# Patient Record
Sex: MALE | Race: WHITE | URBAN - METROPOLITAN AREA
[De-identification: names, ages, dates, MRNs, and addresses within clinical notes are randomized per-mention and may not be internally consistent; named-entity substitution may affect disease eponyms.]

---

## 2024-01-02 ENCOUNTER — APPOINTMENT (OUTPATIENT)
Dept: CT IMAGING | Age: 54
DRG: 254 | End: 2024-01-02
Payer: COMMERCIAL

## 2024-01-02 ENCOUNTER — HOSPITAL ENCOUNTER (INPATIENT)
Age: 54
LOS: 1 days | Discharge: ANOTHER HOSPITAL | DRG: 254 | End: 2024-01-04
Attending: STUDENT IN AN ORGANIZED HEALTH CARE EDUCATION/TRAINING PROGRAM | Admitting: INTERNAL MEDICINE
Payer: COMMERCIAL

## 2024-01-02 ENCOUNTER — APPOINTMENT (OUTPATIENT)
Dept: GENERAL RADIOLOGY | Age: 54
DRG: 254 | End: 2024-01-02
Payer: COMMERCIAL

## 2024-01-02 DIAGNOSIS — I95.9 HYPOTENSION, UNSPECIFIED HYPOTENSION TYPE: ICD-10-CM

## 2024-01-02 DIAGNOSIS — S30.1XXA ABDOMINAL WALL HEMATOMA, INITIAL ENCOUNTER: Primary | ICD-10-CM

## 2024-01-02 LAB
ABO + RH BLD: NORMAL
ALBUMIN SERPL-MCNC: 4 G/DL (ref 3.4–5)
ALBUMIN/GLOB SERPL: 2.2 {RATIO} (ref 1.1–2.2)
ALP SERPL-CCNC: 48 U/L (ref 40–129)
ALT SERPL-CCNC: 15 U/L (ref 10–40)
ANION GAP SERPL CALCULATED.3IONS-SCNC: 8 MMOL/L (ref 3–16)
AST SERPL-CCNC: 14 U/L (ref 15–37)
BASE EXCESS BLDV CALC-SCNC: 1 MMOL/L (ref -3–3)
BASOPHILS # BLD: 0.1 K/UL (ref 0–0.2)
BASOPHILS NFR BLD: 0.7 %
BILIRUB SERPL-MCNC: <0.2 MG/DL (ref 0–1)
BILIRUB UR QL STRIP.AUTO: NEGATIVE
BLD GP AB SCN SERPL QL: NORMAL
BUN SERPL-MCNC: 30 MG/DL (ref 7–20)
CALCIUM SERPL-MCNC: 8.6 MG/DL (ref 8.3–10.6)
CHLORIDE SERPL-SCNC: 95 MMOL/L (ref 99–110)
CLARITY UR: CLEAR
CO2 BLDV-SCNC: 25 MMOL/L
CO2 SERPL-SCNC: 29 MMOL/L (ref 21–32)
COHGB MFR BLDV: 5.7 % (ref 0–1.5)
COLOR UR: YELLOW
CREAT SERPL-MCNC: 1.1 MG/DL (ref 0.9–1.3)
DEPRECATED RDW RBC AUTO: 13 % (ref 12.4–15.4)
EOSINOPHIL # BLD: 0.4 K/UL (ref 0–0.6)
EOSINOPHIL NFR BLD: 4.8 %
FLUAV RNA RESP QL NAA+PROBE: NOT DETECTED
FLUBV RNA RESP QL NAA+PROBE: NOT DETECTED
GFR SERPLBLD CREATININE-BSD FMLA CKD-EPI: >60 ML/MIN/{1.73_M2}
GLUCOSE SERPL-MCNC: 114 MG/DL (ref 70–99)
GLUCOSE UR STRIP.AUTO-MCNC: NEGATIVE MG/DL
HCO3 BLDV-SCNC: 23.6 MMOL/L (ref 23–29)
HCT VFR BLD AUTO: 38.5 % (ref 40.5–52.5)
HGB BLD-MCNC: 13.1 G/DL (ref 13.5–17.5)
HGB UR QL STRIP.AUTO: NEGATIVE
INR PPP: 0.98 (ref 0.84–1.16)
KETONES UR STRIP.AUTO-MCNC: NEGATIVE MG/DL
LACTATE BLDV-SCNC: 1.4 MMOL/L (ref 0.4–1.9)
LEUKOCYTE ESTERASE UR QL STRIP.AUTO: NEGATIVE
LIPASE SERPL-CCNC: 15 U/L (ref 13–60)
LYMPHOCYTES # BLD: 2.1 K/UL (ref 1–5.1)
LYMPHOCYTES NFR BLD: 23 %
MCH RBC QN AUTO: 33.9 PG (ref 26–34)
MCHC RBC AUTO-ENTMCNC: 34 G/DL (ref 31–36)
MCV RBC AUTO: 99.5 FL (ref 80–100)
METHGB MFR BLDV: 0.3 %
MONOCYTES # BLD: 0.4 K/UL (ref 0–1.3)
MONOCYTES NFR BLD: 4.6 %
NEUTROPHILS # BLD: 6.2 K/UL (ref 1.7–7.7)
NEUTROPHILS NFR BLD: 66.9 %
NITRITE UR QL STRIP.AUTO: NEGATIVE
O2 CT VFR BLDV CALC: 18 VOL %
O2 THERAPY: ABNORMAL
PCO2 BLDV: 31.8 MMHG (ref 40–50)
PH BLDV: 7.49 [PH] (ref 7.35–7.45)
PH UR STRIP.AUTO: 7 [PH] (ref 5–8)
PLATELET # BLD AUTO: 294 K/UL (ref 135–450)
PMV BLD AUTO: 7.1 FL (ref 5–10.5)
PO2 BLDV: 150.7 MMHG (ref 25–40)
POTASSIUM SERPL-SCNC: 4.7 MMOL/L (ref 3.5–5.1)
PROT SERPL-MCNC: 5.8 G/DL (ref 6.4–8.2)
PROT UR STRIP.AUTO-MCNC: NEGATIVE MG/DL
PROTHROMBIN TIME: 13 SEC (ref 11.5–14.8)
RBC # BLD AUTO: 3.87 M/UL (ref 4.2–5.9)
SAO2 % BLDV: 99 %
SARS-COV-2 RNA RESP QL NAA+PROBE: NOT DETECTED
SODIUM SERPL-SCNC: 132 MMOL/L (ref 136–145)
SP GR UR STRIP.AUTO: 1.01 (ref 1–1.03)
TROPONIN, HIGH SENSITIVITY: 10 NG/L (ref 0–22)
TROPONIN, HIGH SENSITIVITY: 12 NG/L (ref 0–22)
UA COMPLETE W REFLEX CULTURE PNL UR: NORMAL
UA DIPSTICK W REFLEX MICRO PNL UR: NORMAL
URN SPEC COLLECT METH UR: NORMAL
UROBILINOGEN UR STRIP-ACNC: 0.2 E.U./DL
WBC # BLD AUTO: 9.2 K/UL (ref 4–11)

## 2024-01-02 PROCEDURE — 87040 BLOOD CULTURE FOR BACTERIA: CPT

## 2024-01-02 PROCEDURE — 71045 X-RAY EXAM CHEST 1 VIEW: CPT

## 2024-01-02 PROCEDURE — 82803 BLOOD GASES ANY COMBINATION: CPT

## 2024-01-02 PROCEDURE — P9016 RBC LEUKOCYTES REDUCED: HCPCS

## 2024-01-02 PROCEDURE — 80307 DRUG TEST PRSMV CHEM ANLYZR: CPT

## 2024-01-02 PROCEDURE — 86850 RBC ANTIBODY SCREEN: CPT

## 2024-01-02 PROCEDURE — 84484 ASSAY OF TROPONIN QUANT: CPT

## 2024-01-02 PROCEDURE — 96376 TX/PRO/DX INJ SAME DRUG ADON: CPT

## 2024-01-02 PROCEDURE — 74177 CT ABD & PELVIS W/CONTRAST: CPT

## 2024-01-02 PROCEDURE — 93005 ELECTROCARDIOGRAM TRACING: CPT | Performed by: STUDENT IN AN ORGANIZED HEALTH CARE EDUCATION/TRAINING PROGRAM

## 2024-01-02 PROCEDURE — 81003 URINALYSIS AUTO W/O SCOPE: CPT

## 2024-01-02 PROCEDURE — 83690 ASSAY OF LIPASE: CPT

## 2024-01-02 PROCEDURE — 30243N1 TRANSFUSION OF NONAUTOLOGOUS RED BLOOD CELLS INTO CENTRAL VEIN, PERCUTANEOUS APPROACH: ICD-10-PCS | Performed by: INTERNAL MEDICINE

## 2024-01-02 PROCEDURE — 86923 COMPATIBILITY TEST ELECTRIC: CPT

## 2024-01-02 PROCEDURE — 87636 SARSCOV2 & INF A&B AMP PRB: CPT

## 2024-01-02 PROCEDURE — 85025 COMPLETE CBC W/AUTO DIFF WBC: CPT

## 2024-01-02 PROCEDURE — 96361 HYDRATE IV INFUSION ADD-ON: CPT

## 2024-01-02 PROCEDURE — 83605 ASSAY OF LACTIC ACID: CPT

## 2024-01-02 PROCEDURE — 85610 PROTHROMBIN TIME: CPT

## 2024-01-02 PROCEDURE — 2580000003 HC RX 258: Performed by: STUDENT IN AN ORGANIZED HEALTH CARE EDUCATION/TRAINING PROGRAM

## 2024-01-02 PROCEDURE — 99285 EMERGENCY DEPT VISIT HI MDM: CPT

## 2024-01-02 PROCEDURE — 6360000004 HC RX CONTRAST MEDICATION: Performed by: STUDENT IN AN ORGANIZED HEALTH CARE EDUCATION/TRAINING PROGRAM

## 2024-01-02 PROCEDURE — 96374 THER/PROPH/DIAG INJ IV PUSH: CPT

## 2024-01-02 PROCEDURE — 36415 COLL VENOUS BLD VENIPUNCTURE: CPT

## 2024-01-02 PROCEDURE — 86900 BLOOD TYPING SEROLOGIC ABO: CPT

## 2024-01-02 PROCEDURE — 96375 TX/PRO/DX INJ NEW DRUG ADDON: CPT

## 2024-01-02 PROCEDURE — 80053 COMPREHEN METABOLIC PANEL: CPT

## 2024-01-02 PROCEDURE — 86901 BLOOD TYPING SEROLOGIC RH(D): CPT

## 2024-01-02 PROCEDURE — 6360000002 HC RX W HCPCS: Performed by: STUDENT IN AN ORGANIZED HEALTH CARE EDUCATION/TRAINING PROGRAM

## 2024-01-02 RX ORDER — ONDANSETRON 2 MG/ML
4 INJECTION INTRAMUSCULAR; INTRAVENOUS ONCE
Status: COMPLETED | OUTPATIENT
Start: 2024-01-02 | End: 2024-01-02

## 2024-01-02 RX ORDER — KETAMINE HYDROCHLORIDE 100 MG/ML
20 INJECTION, SOLUTION INTRAMUSCULAR; INTRAVENOUS ONCE
Status: COMPLETED | OUTPATIENT
Start: 2024-01-03 | End: 2024-01-03

## 2024-01-02 RX ORDER — SODIUM CHLORIDE 9 MG/ML
INJECTION, SOLUTION INTRAVENOUS PRN
Status: DISCONTINUED | OUTPATIENT
Start: 2024-01-02 | End: 2024-01-04 | Stop reason: HOSPADM

## 2024-01-02 RX ORDER — FENTANYL CITRATE 50 UG/ML
50 INJECTION, SOLUTION INTRAMUSCULAR; INTRAVENOUS ONCE
Status: COMPLETED | OUTPATIENT
Start: 2024-01-02 | End: 2024-01-02

## 2024-01-02 RX ORDER — SODIUM CHLORIDE, SODIUM LACTATE, POTASSIUM CHLORIDE, AND CALCIUM CHLORIDE .6; .31; .03; .02 G/100ML; G/100ML; G/100ML; G/100ML
30 INJECTION, SOLUTION INTRAVENOUS ONCE
Status: COMPLETED | OUTPATIENT
Start: 2024-01-02 | End: 2024-01-02

## 2024-01-02 RX ADMIN — FENTANYL CITRATE 50 MCG: 50 INJECTION, SOLUTION INTRAMUSCULAR; INTRAVENOUS at 21:47

## 2024-01-02 RX ADMIN — HYDROMORPHONE HYDROCHLORIDE 1 MG: 1 INJECTION, SOLUTION INTRAMUSCULAR; INTRAVENOUS; SUBCUTANEOUS at 23:19

## 2024-01-02 RX ADMIN — ONDANSETRON 4 MG: 2 INJECTION INTRAMUSCULAR; INTRAVENOUS at 21:47

## 2024-01-02 RX ADMIN — HYDROMORPHONE HYDROCHLORIDE 0.5 MG: 1 INJECTION, SOLUTION INTRAMUSCULAR; INTRAVENOUS; SUBCUTANEOUS at 22:30

## 2024-01-02 RX ADMIN — SODIUM CHLORIDE, POTASSIUM CHLORIDE, SODIUM LACTATE AND CALCIUM CHLORIDE 2301 ML: 600; 310; 30; 20 INJECTION, SOLUTION INTRAVENOUS at 21:36

## 2024-01-02 RX ADMIN — IOPAMIDOL 75 ML: 755 INJECTION, SOLUTION INTRAVENOUS at 22:32

## 2024-01-02 ASSESSMENT — PAIN DESCRIPTION - LOCATION
LOCATION: ABDOMEN
LOCATION: ABDOMEN

## 2024-01-02 ASSESSMENT — PAIN DESCRIPTION - DESCRIPTORS
DESCRIPTORS: CRUSHING;DISCOMFORT;SHARP
DESCRIPTORS: DISCOMFORT;HEAVINESS

## 2024-01-02 ASSESSMENT — PAIN - FUNCTIONAL ASSESSMENT: PAIN_FUNCTIONAL_ASSESSMENT: 0-10

## 2024-01-02 ASSESSMENT — PAIN SCALES - GENERAL: PAINLEVEL_OUTOF10: 9

## 2024-01-03 ENCOUNTER — APPOINTMENT (OUTPATIENT)
Dept: CT IMAGING | Age: 54
DRG: 254 | End: 2024-01-03
Payer: COMMERCIAL

## 2024-01-03 ENCOUNTER — APPOINTMENT (OUTPATIENT)
Dept: INTERVENTIONAL RADIOLOGY/VASCULAR | Age: 54
DRG: 254 | End: 2024-01-03
Attending: STUDENT IN AN ORGANIZED HEALTH CARE EDUCATION/TRAINING PROGRAM
Payer: COMMERCIAL

## 2024-01-03 PROBLEM — S30.1XXA ABDOMINAL WALL HEMATOMA: Status: ACTIVE | Noted: 2024-01-03

## 2024-01-03 PROBLEM — I95.9 HYPOTENSION: Status: ACTIVE | Noted: 2024-01-03

## 2024-01-03 PROBLEM — R10.84 ABDOMINAL PAIN, DIFFUSE: Status: ACTIVE | Noted: 2024-01-03

## 2024-01-03 PROBLEM — R58 RETROPERITONEAL HEMORRHAGE: Status: ACTIVE | Noted: 2024-01-03

## 2024-01-03 LAB
AMPHETAMINES UR QL SCN>1000 NG/ML: ABNORMAL
BARBITURATES UR QL SCN>200 NG/ML: ABNORMAL
BASE EXCESS BLDA CALC-SCNC: -0.1 MMOL/L (ref -3–3)
BENZODIAZ UR QL SCN>200 NG/ML: ABNORMAL
BLOOD BANK DISPENSE STATUS: NORMAL
BLOOD BANK PRODUCT CODE: NORMAL
BPU ID: NORMAL
CANNABINOIDS UR QL SCN>50 NG/ML: ABNORMAL
CO2 BLDA-SCNC: 24.8 MMOL/L
COCAINE UR QL SCN: ABNORMAL
COHGB MFR BLDA: 0.3 % (ref 0–1.5)
DESCRIPTION BLOOD BANK: NORMAL
DRUG SCREEN COMMENT UR-IMP: ABNORMAL
EKG ATRIAL RATE: 59 BPM
EKG DIAGNOSIS: NORMAL
EKG P AXIS: 69 DEGREES
EKG P-R INTERVAL: 132 MS
EKG Q-T INTERVAL: 410 MS
EKG QRS DURATION: 92 MS
EKG QTC CALCULATION (BAZETT): 405 MS
EKG R AXIS: 67 DEGREES
EKG T AXIS: 46 DEGREES
EKG VENTRICULAR RATE: 59 BPM
FENTANYL SCREEN, URINE: ABNORMAL
HCO3 BLDA-SCNC: 23.7 MMOL/L (ref 21–29)
HCT VFR BLD AUTO: 25.9 % (ref 40.5–52.5)
HCT VFR BLD AUTO: 28.5 % (ref 40.5–52.5)
HCT VFR BLD AUTO: 30.4 % (ref 40.5–52.5)
HCT VFR BLD AUTO: 30.4 % (ref 40.5–52.5)
HCT VFR BLD AUTO: 30.7 % (ref 40.5–52.5)
HGB BLD-MCNC: 10.3 G/DL (ref 13.5–17.5)
HGB BLD-MCNC: 10.3 G/DL (ref 13.5–17.5)
HGB BLD-MCNC: 10.7 G/DL (ref 13.5–17.5)
HGB BLD-MCNC: 9 G/DL (ref 13.5–17.5)
HGB BLD-MCNC: 9.7 G/DL (ref 13.5–17.5)
HGB BLDA-MCNC: 11.1 G/DL (ref 13.5–17.5)
METHADONE UR QL SCN>300 NG/ML: ABNORMAL
METHGB MFR BLDA: 0.3 %
O2 THERAPY: ABNORMAL
OPIATES UR QL SCN>300 NG/ML: ABNORMAL
OXYCODONE UR QL SCN: POSITIVE
PCO2 BLDA: 35.5 MMHG (ref 35–45)
PCP UR QL SCN>25 NG/ML: ABNORMAL
PH BLDA: 7.44 [PH] (ref 7.35–7.45)
PH UR STRIP: 7 [PH]
PO2 BLDA: 94 MMHG (ref 75–108)
SAO2 % BLDA: 97.5 %

## 2024-01-03 PROCEDURE — 93010 ELECTROCARDIOGRAM REPORT: CPT | Performed by: INTERNAL MEDICINE

## 2024-01-03 PROCEDURE — 2580000003 HC RX 258: Performed by: RADIOLOGY

## 2024-01-03 PROCEDURE — 2500000003 HC RX 250 WO HCPCS: Performed by: STUDENT IN AN ORGANIZED HEALTH CARE EDUCATION/TRAINING PROGRAM

## 2024-01-03 PROCEDURE — 85014 HEMATOCRIT: CPT

## 2024-01-03 PROCEDURE — 96375 TX/PRO/DX INJ NEW DRUG ADDON: CPT

## 2024-01-03 PROCEDURE — 2000000000 HC ICU R&B

## 2024-01-03 PROCEDURE — 6360000002 HC RX W HCPCS: Performed by: INTERNAL MEDICINE

## 2024-01-03 PROCEDURE — C1887 CATHETER, GUIDING: HCPCS

## 2024-01-03 PROCEDURE — 99153 MOD SED SAME PHYS/QHP EA: CPT

## 2024-01-03 PROCEDURE — 96366 THER/PROPH/DIAG IV INF ADDON: CPT

## 2024-01-03 PROCEDURE — 36415 COLL VENOUS BLD VENIPUNCTURE: CPT

## 2024-01-03 PROCEDURE — 6360000002 HC RX W HCPCS: Performed by: RADIOLOGY

## 2024-01-03 PROCEDURE — 99223 1ST HOSP IP/OBS HIGH 75: CPT | Performed by: INTERNAL MEDICINE

## 2024-01-03 PROCEDURE — 37799 UNLISTED PX VASCULAR SURGERY: CPT

## 2024-01-03 PROCEDURE — 6370000000 HC RX 637 (ALT 250 FOR IP): Performed by: INTERNAL MEDICINE

## 2024-01-03 PROCEDURE — 75726 ARTERY X-RAYS ABDOMEN: CPT

## 2024-01-03 PROCEDURE — 36246 INS CATH ABD/L-EXT ART 2ND: CPT

## 2024-01-03 PROCEDURE — B41C1ZZ FLUOROSCOPY OF PELVIC ARTERIES USING LOW OSMOLAR CONTRAST: ICD-10-PCS | Performed by: RADIOLOGY

## 2024-01-03 PROCEDURE — 2700000000 HC OXYGEN THERAPY PER DAY

## 2024-01-03 PROCEDURE — 2500000003 HC RX 250 WO HCPCS: Performed by: INTERNAL MEDICINE

## 2024-01-03 PROCEDURE — C1769 GUIDE WIRE: HCPCS

## 2024-01-03 PROCEDURE — 94761 N-INVAS EAR/PLS OXIMETRY MLT: CPT

## 2024-01-03 PROCEDURE — 2580000003 HC RX 258: Performed by: INTERNAL MEDICINE

## 2024-01-03 PROCEDURE — 85018 HEMOGLOBIN: CPT

## 2024-01-03 PROCEDURE — B4141ZZ FLUOROSCOPY OF SUPERIOR MESENTERIC ARTERY USING LOW OSMOLAR CONTRAST: ICD-10-PCS | Performed by: RADIOLOGY

## 2024-01-03 PROCEDURE — 96365 THER/PROPH/DIAG IV INF INIT: CPT

## 2024-01-03 PROCEDURE — 96376 TX/PRO/DX INJ SAME DRUG ADON: CPT

## 2024-01-03 PROCEDURE — 75774 ARTERY X-RAY EACH VESSEL: CPT

## 2024-01-03 PROCEDURE — 2580000003 HC RX 258: Performed by: STUDENT IN AN ORGANIZED HEALTH CARE EDUCATION/TRAINING PROGRAM

## 2024-01-03 PROCEDURE — 82803 BLOOD GASES ANY COMBINATION: CPT

## 2024-01-03 PROCEDURE — 6360000004 HC RX CONTRAST MEDICATION: Performed by: STUDENT IN AN ORGANIZED HEALTH CARE EDUCATION/TRAINING PROGRAM

## 2024-01-03 PROCEDURE — 6360000002 HC RX W HCPCS: Performed by: STUDENT IN AN ORGANIZED HEALTH CARE EDUCATION/TRAINING PROGRAM

## 2024-01-03 PROCEDURE — 99152 MOD SED SAME PHYS/QHP 5/>YRS: CPT

## 2024-01-03 PROCEDURE — 74174 CTA ABD&PLVS W/CONTRAST: CPT

## 2024-01-03 PROCEDURE — 6360000002 HC RX W HCPCS: Performed by: EMERGENCY MEDICINE

## 2024-01-03 RX ORDER — SODIUM CHLORIDE 0.9 % (FLUSH) 0.9 %
10 SYRINGE (ML) INJECTION CONTINUOUS
Status: DISCONTINUED | OUTPATIENT
Start: 2024-01-03 | End: 2024-01-04 | Stop reason: HOSPADM

## 2024-01-03 RX ORDER — SODIUM CHLORIDE 0.9 % (FLUSH) 0.9 %
5-40 SYRINGE (ML) INJECTION PRN
Status: DISCONTINUED | OUTPATIENT
Start: 2024-01-03 | End: 2024-01-04 | Stop reason: HOSPADM

## 2024-01-03 RX ORDER — NICOTINE 21 MG/24HR
1 PATCH, TRANSDERMAL 24 HOURS TRANSDERMAL DAILY
Status: DISCONTINUED | OUTPATIENT
Start: 2024-01-03 | End: 2024-01-04 | Stop reason: HOSPADM

## 2024-01-03 RX ORDER — FENTANYL CITRATE 50 UG/ML
INJECTION, SOLUTION INTRAMUSCULAR; INTRAVENOUS PRN
Status: COMPLETED | OUTPATIENT
Start: 2024-01-03 | End: 2024-01-03

## 2024-01-03 RX ORDER — BISACODYL 10 MG
10 SUPPOSITORY, RECTAL RECTAL ONCE
Status: COMPLETED | OUTPATIENT
Start: 2024-01-03 | End: 2024-01-03

## 2024-01-03 RX ORDER — SODIUM CHLORIDE 9 MG/ML
INJECTION, SOLUTION INTRAVENOUS PRN
Status: DISCONTINUED | OUTPATIENT
Start: 2024-01-03 | End: 2024-01-04 | Stop reason: HOSPADM

## 2024-01-03 RX ORDER — ONDANSETRON 4 MG/1
4 TABLET, ORALLY DISINTEGRATING ORAL EVERY 8 HOURS PRN
Status: DISCONTINUED | OUTPATIENT
Start: 2024-01-03 | End: 2024-01-04 | Stop reason: HOSPADM

## 2024-01-03 RX ORDER — POLYETHYLENE GLYCOL 3350 17 G/17G
17 POWDER, FOR SOLUTION ORAL DAILY PRN
Status: DISCONTINUED | OUTPATIENT
Start: 2024-01-03 | End: 2024-01-04 | Stop reason: HOSPADM

## 2024-01-03 RX ORDER — ACETAMINOPHEN 650 MG/1
650 SUPPOSITORY RECTAL EVERY 6 HOURS PRN
Status: DISCONTINUED | OUTPATIENT
Start: 2024-01-03 | End: 2024-01-04 | Stop reason: HOSPADM

## 2024-01-03 RX ORDER — DOCUSATE SODIUM 100 MG/1
100 CAPSULE, LIQUID FILLED ORAL DAILY
Status: DISCONTINUED | OUTPATIENT
Start: 2024-01-03 | End: 2024-01-04 | Stop reason: HOSPADM

## 2024-01-03 RX ORDER — OXYCODONE HYDROCHLORIDE AND ACETAMINOPHEN 5; 325 MG/1; MG/1
1 TABLET ORAL EVERY 6 HOURS PRN
Status: DISCONTINUED | OUTPATIENT
Start: 2024-01-03 | End: 2024-01-04 | Stop reason: HOSPADM

## 2024-01-03 RX ORDER — POTASSIUM CHLORIDE 750 MG/1
40 TABLET, EXTENDED RELEASE ORAL PRN
Status: DISCONTINUED | OUTPATIENT
Start: 2024-01-03 | End: 2024-01-04 | Stop reason: HOSPADM

## 2024-01-03 RX ORDER — POLYETHYLENE GLYCOL 3350 17 G/17G
17 POWDER, FOR SOLUTION ORAL 2 TIMES DAILY
Status: DISCONTINUED | OUTPATIENT
Start: 2024-01-03 | End: 2024-01-04 | Stop reason: HOSPADM

## 2024-01-03 RX ORDER — ONDANSETRON 2 MG/ML
4 INJECTION INTRAMUSCULAR; INTRAVENOUS EVERY 6 HOURS PRN
Status: DISCONTINUED | OUTPATIENT
Start: 2024-01-03 | End: 2024-01-04 | Stop reason: HOSPADM

## 2024-01-03 RX ORDER — ONDANSETRON 2 MG/ML
4 INJECTION INTRAMUSCULAR; INTRAVENOUS ONCE
Status: COMPLETED | OUTPATIENT
Start: 2024-01-03 | End: 2024-01-03

## 2024-01-03 RX ORDER — SODIUM CHLORIDE 0.9 % (FLUSH) 0.9 %
5-40 SYRINGE (ML) INJECTION EVERY 12 HOURS SCHEDULED
Status: DISCONTINUED | OUTPATIENT
Start: 2024-01-03 | End: 2024-01-04 | Stop reason: HOSPADM

## 2024-01-03 RX ORDER — ACETAMINOPHEN 325 MG/1
650 TABLET ORAL EVERY 6 HOURS PRN
Status: DISCONTINUED | OUTPATIENT
Start: 2024-01-03 | End: 2024-01-04 | Stop reason: HOSPADM

## 2024-01-03 RX ORDER — MIDAZOLAM HYDROCHLORIDE 1 MG/ML
INJECTION INTRAMUSCULAR; INTRAVENOUS PRN
Status: COMPLETED | OUTPATIENT
Start: 2024-01-03 | End: 2024-01-03

## 2024-01-03 RX ORDER — POTASSIUM CHLORIDE 7.45 MG/ML
10 INJECTION INTRAVENOUS PRN
Status: DISCONTINUED | OUTPATIENT
Start: 2024-01-03 | End: 2024-01-04 | Stop reason: HOSPADM

## 2024-01-03 RX ORDER — MAGNESIUM SULFATE IN WATER 40 MG/ML
2000 INJECTION, SOLUTION INTRAVENOUS PRN
Status: DISCONTINUED | OUTPATIENT
Start: 2024-01-03 | End: 2024-01-04 | Stop reason: HOSPADM

## 2024-01-03 RX ADMIN — OXYCODONE AND ACETAMINOPHEN 1 TABLET: 5; 325 TABLET ORAL at 22:15

## 2024-01-03 RX ADMIN — HYDROMORPHONE HYDROCHLORIDE 1 MG: 1 INJECTION, SOLUTION INTRAMUSCULAR; INTRAVENOUS; SUBCUTANEOUS at 15:52

## 2024-01-03 RX ADMIN — ONDANSETRON 4 MG: 2 INJECTION INTRAMUSCULAR; INTRAVENOUS at 03:19

## 2024-01-03 RX ADMIN — FENTANYL CITRATE 25 MCG: 50 INJECTION INTRAMUSCULAR; INTRAVENOUS at 05:42

## 2024-01-03 RX ADMIN — IOPAMIDOL 100 ML: 755 INJECTION, SOLUTION INTRAVENOUS at 00:33

## 2024-01-03 RX ADMIN — MIDAZOLAM 1 MG: 1 INJECTION INTRAMUSCULAR; INTRAVENOUS at 04:45

## 2024-01-03 RX ADMIN — Medication 10 ML: at 06:46

## 2024-01-03 RX ADMIN — KETAMINE HYDROCHLORIDE 0.05 MG/KG/HR: 100 INJECTION, SOLUTION, CONCENTRATE INTRAMUSCULAR; INTRAVENOUS at 02:08

## 2024-01-03 RX ADMIN — FENTANYL CITRATE 50 MCG: 50 INJECTION INTRAMUSCULAR; INTRAVENOUS at 04:45

## 2024-01-03 RX ADMIN — SODIUM CHLORIDE, PRESERVATIVE FREE 10 ML: 5 INJECTION INTRAVENOUS at 08:29

## 2024-01-03 RX ADMIN — DOCUSATE SODIUM 100 MG: 100 CAPSULE, LIQUID FILLED ORAL at 15:39

## 2024-01-03 RX ADMIN — ONDANSETRON 4 MG: 2 INJECTION INTRAMUSCULAR; INTRAVENOUS at 01:25

## 2024-01-03 RX ADMIN — SODIUM CHLORIDE, PRESERVATIVE FREE 10 ML: 5 INJECTION INTRAVENOUS at 21:13

## 2024-01-03 RX ADMIN — HYDROMORPHONE HYDROCHLORIDE 1 MG: 1 INJECTION, SOLUTION INTRAMUSCULAR; INTRAVENOUS; SUBCUTANEOUS at 12:50

## 2024-01-03 RX ADMIN — KETAMINE HYDROCHLORIDE 20 MG: 100 INJECTION, SOLUTION, CONCENTRATE INTRAMUSCULAR; INTRAVENOUS at 00:09

## 2024-01-03 RX ADMIN — MIDAZOLAM 0.5 MG: 1 INJECTION INTRAMUSCULAR; INTRAVENOUS at 05:42

## 2024-01-03 RX ADMIN — MIDAZOLAM 0.5 MG: 1 INJECTION INTRAMUSCULAR; INTRAVENOUS at 05:31

## 2024-01-03 RX ADMIN — HYDROMORPHONE HYDROCHLORIDE 0.5 MG: 1 INJECTION, SOLUTION INTRAMUSCULAR; INTRAVENOUS; SUBCUTANEOUS at 09:26

## 2024-01-03 RX ADMIN — HYDROMORPHONE HYDROCHLORIDE 1 MG: 1 INJECTION, SOLUTION INTRAMUSCULAR; INTRAVENOUS; SUBCUTANEOUS at 21:13

## 2024-01-03 RX ADMIN — MUPIROCIN: 20 OINTMENT TOPICAL at 21:13

## 2024-01-03 RX ADMIN — ONDANSETRON 4 MG: 2 INJECTION INTRAMUSCULAR; INTRAVENOUS at 15:52

## 2024-01-03 RX ADMIN — FENTANYL CITRATE 25 MCG: 50 INJECTION INTRAMUSCULAR; INTRAVENOUS at 05:31

## 2024-01-03 RX ADMIN — ONDANSETRON 4 MG: 2 INJECTION INTRAMUSCULAR; INTRAVENOUS at 09:10

## 2024-01-03 RX ADMIN — HYDROMORPHONE HYDROCHLORIDE 0.5 MG: 1 INJECTION, SOLUTION INTRAMUSCULAR; INTRAVENOUS; SUBCUTANEOUS at 12:15

## 2024-01-03 RX ADMIN — OXYCODONE AND ACETAMINOPHEN 1 TABLET: 5; 325 TABLET ORAL at 12:53

## 2024-01-03 RX ADMIN — BISACODYL 10 MG: 10 SUPPOSITORY RECTAL at 12:51

## 2024-01-03 ASSESSMENT — PAIN DESCRIPTION - DESCRIPTORS
DESCRIPTORS: DISCOMFORT
DESCRIPTORS: DISCOMFORT

## 2024-01-03 ASSESSMENT — PAIN DESCRIPTION - ONSET: ONSET: ON-GOING

## 2024-01-03 ASSESSMENT — PAIN SCALES - GENERAL
PAINLEVEL_OUTOF10: 10
PAINLEVEL_OUTOF10: 9
PAINLEVEL_OUTOF10: 7
PAINLEVEL_OUTOF10: 0
PAINLEVEL_OUTOF10: 9
PAINLEVEL_OUTOF10: 5
PAINLEVEL_OUTOF10: 0
PAINLEVEL_OUTOF10: 9

## 2024-01-03 ASSESSMENT — PAIN DESCRIPTION - PAIN TYPE: TYPE: ACUTE PAIN

## 2024-01-03 ASSESSMENT — PAIN DESCRIPTION - LOCATION
LOCATION: ABDOMEN

## 2024-01-03 ASSESSMENT — PAIN DESCRIPTION - FREQUENCY: FREQUENCY: CONTINUOUS

## 2024-01-03 ASSESSMENT — PAIN DESCRIPTION - ORIENTATION: ORIENTATION: RIGHT

## 2024-01-03 NOTE — PRE SEDATION
Sedation Pre-Procedure Note    Patient Name: Deondre Brothers   YOB: 1970  Room/Bed: 01/01  Medical Record Number: [id number]  Date: 1/3/2024   Time: 5:42 AM       Indication:  pelvic/abdoinal bleed.    Consent: I have discussed with the patient and/or the patient representative the indication, alternatives, and the possible risks and/or complications of the planned procedure and the anesthesia methods. The patient and/or patient representative appear to understand and agree to proceed.    Vital Signs:   Vitals:    01/03/24 0536   BP: 132/74   Pulse: 70   Resp: 14   Temp:    SpO2: 99%       Past Medical History:   has no past medical history on file.    Past Surgical History:   has no past surgical history on file.    Medications:   Scheduled Meds:   Continuous Infusions:    ketamine (KETALAR) 500 mg in sodium chloride 0.9 % 100 mL infusion Stopped (01/03/24 0400)    sodium chloride       PRN Meds: fentanNYL, midazolam, sodium chloride  Home Meds:   Prior to Admission medications    Not on File     Coumadin Use Last 7 Days:  no  Antiplatelet drug therapy use last 7 days: no  Other anticoagulant use last 7 days: no  Additional Medication Information:  n/a      Pre-Sedation Documentation and Exam:   I have reviewed the patient's history and review of systems.    Mallampati Airway Assessment:  Mallampati Class II - (soft palate, fauces & uvula are visible)    Prior History of Anesthesia Complications:   none    ASA Classification:  Class 3 - A patient with severe systemic disease that limits activity but is not incapacitating    Sedation/ Anesthesia Plan:   intravenous sedation    Medications Planned:   midazolam (Versed) intravenously and fentanyl intravenously    Patient is an appropriate candidate for plan of sedation: yes    Electronically signed by Christopher Holliday MD on 1/3/2024 at 5:42 AM

## 2024-01-03 NOTE — PLAN OF CARE
Interventional Radiology Note    Called for patient pelvic bleed and hematoma. The patient is currently hemodynamically stable as he is not tachycardic and is normotensive. CT scan shows active extravasation from unclear source. This might reflect a venous bleed. Recommend further imaging with triple phase CTA to better identify the source of the bleed and whether it has stopped since the patient has stabilized.     At any point, if the patient becomes hemodynamically unstable then we will come in for emergent angiography and embolization.    Findings were discussed with Dr. Jones at 12am on 1/3/24.

## 2024-01-03 NOTE — CARE COORDINATION
01/03/24 1239   Service Assessment   Patient Orientation Alert and Oriented;Person;Place   Cognition Alert   History Provided By Patient   Primary Caregiver Self   Support Systems None   Patient's Healthcare Decision Maker is:   (No family listed. Has one estranged daughter.)   PCP Verified by CM No  (pt is homeless. Unclear if he has a PCP.)   Prior Functional Level Independent in ADLs/IADLs   Current Functional Level Independent in ADLs/IADLs   Can patient return to prior living arrangement Other (see comment)  (Patient is homeless)   Ability to make needs known: Good   Family able to assist with home care needs: No   Would you like for me to discuss the discharge plan with any other family members/significant others, and if so, who? No   Financial Resources Medicaid   Community Resources None   Discharge Planning   Type of Residence Other (Comment)  (Homeless)   Living Arrangements   (Homeless)   Current Services Prior To Admission None   Potential Assistance Needed Other (Comment)  (Homeless - came from [facility])   DME Ordered? No   Potential Assistance Purchasing Medications No   Type of Home Care Services None   Patient expects to be discharged to: Other (comment)  ([facility]al [facility])   Follow Up Appointment: Best Day/Time    (self)   One/Two Story Residence   (Homeless)   History of falls? 0     Case Management Assessment  Initial Evaluation    Date/Time of Evaluation: 1/3/2024 12:47 PM  Assessment Completed by: Teresa Boeck, RN    If patient is discharged prior to next notation, then this note serves as note for discharge by case management.    Patient Name: Deondre Brothers                   YOB: 1970  Diagnosis: Retroperitoneal hemorrhage [R58]  Abdominal wall hematoma, initial encounter [S30.1XXA]  Hypotension, unspecified hypotension type [I95.9]                   Date / Time: 1/2/2024  9:18 PM    Patient Admission Status: Inpatient   Readmission Risk (Low < 19, Mod  No  Meds-to-Beds request:      No Pharmacies Listed    Notes:    Factors facilitating achievement of predicted outcomes:     Barriers to discharge: Homeless, lives on [address] in [place], has one estranged daughter    Additional Case Management Notes: CM met with patient at bedside along with Mishel SMITH and Dr. Rodriguez. The patient is here from [facility]. The patient lives in [place] on [address]. The patient is homeless. The patient had an emergency surgery in [place] at [facility]. The patient was sent to [facility]. The patient was brought to [facility] from [facility]. The patient is bleeding internaly possibly from recent surgery. Surgery Team at [facility] could not locate the source of the bleeding.     The patient is asking for someone to locate his daughter. The patient saw his daughter on Facebook one year ago. The daughter's name is Noris.    The Plan for Transition of Care is related to the following treatment goals of Retroperitoneal hemorrhage [R58]  Abdominal wall hematoma, initial encounter [S30.1XXA]  Hypotension, unspecified hypotension type [I95.9]    IF APPLICABLE: The Patient and/or patient representative Deondre and his family were provided with a choice of provider and agrees with the discharge plan. Freedom of choice list with basic dialogue that supports the patient's individualized plan of care/goals and shares the quality data associated with the providers was provided to:     Patient Representative Name:       The Patient and/or Patient Representative Agree with the Discharge Plan?      Teresa Boeck, RN  Case Management Department  Ph: [phone]

## 2024-01-03 NOTE — PROGRESS NOTES
Pt arrived for image guided angiography with possible embolization with Dr. Holliday. Procedure explained including the risk and benefits of the procedure. All questions answered. Pt verbalizes understanding of the procedure and states no more questions. Consent confirmed. Vital signs stable. Labs, allergies, medications, and code status reviewed. No contraindications noted.     Vital Signs  Vitals:    01/03/24 0348   BP: 112/66   Pulse: 77   Resp: 19   Temp:    SpO2: 93%    (vital signs in table format)    Pre Chandrika Score  2 - Able to move 4 extremities voluntarily on command  2 - BP+/- 20mmHg of normal  2 - Fully awake  2 - Able to maintain oxygen saturation >92% on room air  2 - Able to breathe deeply and cough freely    Allergies  Patient has no known allergies. (allergies)    Labs  Lab Results   Component Value Date    INR 0.98 01/02/2024    PROTIME 13.0 01/02/2024     Lab Results   Component Value Date    CREATININE 1.1 01/02/2024    BUN 30 (H) 01/02/2024     (L) 01/02/2024    K 4.7 01/02/2024    CL 95 (L) 01/02/2024    CO2 29 01/02/2024     Lab Results   Component Value Date    WBC 9.2 01/02/2024    HGB 9.7 (L) 01/03/2024    HCT 28.5 (L) 01/03/2024    MCV 99.5 01/02/2024     01/02/2024

## 2024-01-03 NOTE — ED NOTES
Call placed to [facility]. Dr. Jones took call and will be connected to doctor on call for IR.

## 2024-01-03 NOTE — CARE COORDINATION
CM spoke to [facility] and they noted that when the patient is stable, he could return to the facility. Please note, the patient was not mandated to go, he was there voluntarily. [facility] also noted that they would come and get the patient.   Patient asked CM on unit to locate his daughter, Noris Betts 5/13/93. CM sent a request to [facility] to see if they would be able to assist with locating NOK.

## 2024-01-03 NOTE — FLOWSHEET NOTE
01/03/24 0800   Vital Signs   Temp 98.7 °F (37.1 °C)   Temp Source Oral   Pulse 77   Heart Rate Source Monitor   Respirations 15   /74   MAP (Calculated) 87   MAP (mmHg) 84   BP Location Right upper arm   BP Method Automatic   Patient Position Supine   Pain Assessment   Pain Assessment None - Denies Pain   Oxygen Therapy   SpO2 95 %     Vitals and assessment completed. VSS, no s/s of distress. Patient resting comfortably in supine position. Sheath clean dry and intact, with no hematoma noted. LLE pulses palpable. Patient denies pain/ No further needs at this time.     Mishel Chapa RN    
   01/03/24 0915   Vital Signs   Pulse 78   Respirations 15   BP (!) 155/96   MAP (Calculated) 116   MAP (mmHg) 112   Oxygen Therapy   SpO2 99 %     Vitals completed. Patient c/o nausea, spitting up without emesis. PRN zofran given. Patient also c/o pain in abdomen. Abdomen rounded, patient states he can \"taste blood.\" MD notified. PRN dilaudid available, to be given.     Mishel Chapa RN    
   01/03/24 1045   Vital Signs   Pulse 82   Respirations 21   /84   MAP (Calculated) 98   MAP (mmHg) 96   Oxygen Therapy   SpO2 94 %     VSS. Abdomen rounded/distended and pt c/o pain and requesting to sit up. Patient educated on importance of remaining supine due to arterial sheath in place. Patient appears to be agitated and requesting to speak to the doctor. He states that he has been \"continuously throwing up\" but no emesis noted, only small amount of spit.        Mishel Chapa RN    
   01/03/24 1215   Vital Signs   Pulse 83   Respirations 20   /72   MAP (Calculated) 86   MAP (mmHg) 85   Pain Assessment   Pain Level 10   Patient's Stated Pain Goal 0 - No pain   Pain Location Abdomen   Pain Orientation Right   Oxygen Therapy   SpO2 99 %     Vitals and reassessment completed, no significant changes. Patient c/o 10/10 pain. MD at bedside. MD to increase pain medication, orders to administer 0.5 dilaudid now, given. Patient states \"that's not enough, I need more.\" Daughter contact information is not listed and patient unaware of phone number, only facebook messenger. Patient requesting we call a  to \"find his daughter\" because \"that's what they do.\"     Patient agitated and yelling at RN and KAIDEN. MD notified.     Mishel Chapa RN    
   01/03/24 1245   Vital Signs   Pulse 87   Respirations 20   /68   MAP (Calculated) 81   MAP (mmHg) 78   Opioid-Induced Sedation   POSS Score 1   Oxygen Therapy   SpO2 94 %     Vitals completed. MD ordered dilaudid now, along with percocet to be given at same time. Meds given, per order.    Mishel Chapa RN    
   01/03/24 1545   Vital Signs   Temp 98.7 °F (37.1 °C)   Temp Source Oral   Pulse 76   Heart Rate Source Monitor   Respirations 15   BP (!) 132/91   MAP (Calculated) 105   MAP (mmHg) 104   BP Location Right upper arm   BP Method Automatic   Patient Position Supine   Pain Assessment   Pain Assessment 0-10   Pain Level 9   Oxygen Therapy   SpO2 96 %   O2 Device None (Room air)     Vitals and reassessment completed, no significant changes. Patient c/o pain, PRN pain medication and zofran given per request. No further needs at this time.     Mishel Chapa, RN    
20-Jul-2022

## 2024-01-03 NOTE — PROGRESS NOTES
Patient admitted to Room 4 in the ICU. Patient is alert and oriented and denies pain at this time. NS infusing through right groin sheath, intact, no sign of bleeding or hematoma. Labs sent. CHG bath and skin assessment performed. Vitals stable at this time. Palpable pulses in all 4 extremities.

## 2024-01-03 NOTE — PROGRESS NOTES
4 Eyes Skin Assessment     NAME:  Deondre Brothers  YOB: 1970  MEDICAL RECORD NUMBER:  [id number]    The patient is being assessed for  Admission    I agree that at least one RN has performed a thorough Head to Toe Skin Assessment on the patient. ALL assessment sites listed below have been assessed.      Areas assessed by both nurses:    Head, Face, Ears, Shoulders, Back, Chest, Arms, Elbows, Hands, Sacrum. Buttock, Coccyx, Ischium, Legs. Feet and Heels, Under Medical Devices , and Other          Does the Patient have a Wound? No noted wound(s)       Germán Prevention initiated by RN: No  Wound Care Orders initiated by RN: No    Pressure Injury (Stage 3,4, Unstageable, DTI, NWPT, and Complex wounds) if present, place Wound referral order by RN under : No    New Ostomies, if present place, Ostomy referral order under : No     Nurse 1 eSignature: Electronically signed by Yair Chandra RN on 1/3/24 at 6:50 AM EST    **SHARE this note so that the co-signing nurse can place an eSignature**    Nurse 2 eSignature: Electronically signed by Pat Vasquez RN on 1/3/24 at 6:51 AM EST

## 2024-01-03 NOTE — ED PROVIDER NOTES
Patient presents with the above complaints and was signed out to me by the prior physician for final disposition.  I also examined the patient.    Labs Reviewed   CBC WITH AUTO DIFFERENTIAL - Abnormal; Notable for the following components:       Result Value    RBC 3.87 (*)     Hemoglobin 13.1 (*)     Hematocrit 38.5 (*)     All other components within normal limits   COMPREHENSIVE METABOLIC PANEL W/ REFLEX TO MG FOR LOW K - Abnormal; Notable for the following components:    Sodium 132 (*)     Chloride 95 (*)     Glucose 114 (*)     BUN 30 (*)     Total Protein 5.8 (*)     AST 14 (*)     All other components within normal limits   BLOOD GAS, VENOUS - Abnormal; Notable for the following components:    pH, Kamran 7.489 (*)     pCO2, Kamran 31.8 (*)     pO2, Kamran 150.7 (*)     Carboxyhemoglobin 5.7 (*)     All other components within normal limits   URINE DRUG SCREEN - Abnormal; Notable for the following components:    Oxycodone Urine POSITIVE (*)     All other components within normal limits   HEMOGLOBIN AND HEMATOCRIT - Abnormal; Notable for the following components:    Hemoglobin 9.7 (*)     Hematocrit 28.5 (*)     All other components within normal limits   COVID-19 & INFLUENZA COMBO   CULTURE, BLOOD 1   CULTURE, BLOOD 2   LIPASE   LACTATE, SEPSIS   TROPONIN   TROPONIN   PROTIME-INR   URINALYSIS WITH REFLEX TO CULTURE   LACTATE, SEPSIS   TYPE AND SCREEN   PREPARE RBC (CROSSMATCH)        CTA ABDOMEN PELVIS W WO CONTRAST   Final Result   Redemonstration of active extravasation within the right pelvic   extraperitoneal hematoma.  Arterial phase contrast extravasation is   demonstrated and the size of the hematoma has slightly increased in the   interval.         CT ABDOMEN PELVIS W IV CONTRAST Additional Contrast? None   Final Result   Large extraperitoneal hematoma in the right pelvis measures at least 11.4 x   9.0 x 12 cm. There is contrast extravasation along the right anterolateral   margin of this collection, which

## 2024-01-03 NOTE — CONSULTS
ED contacted Dr. Fraser last night for this patient that is POD 4 from robotic inguinal hernia repair at [facility].      Diagnosed with R side extraperitoneal hematoma with active bleeding.    Recommendation made for IR consultation who performed angiogram and was unable to identify active arterial bleeding.  Films reviewed by our PA with Dr. Damon.    Patient has received 1 unit PRBC.  H/H stable.  Vitals stable.    Recommendation is to discuss protocol with IR for sheath removal.      Continue to follow H/H and vitals.  If becomes unstable then Dr. Damon agreeable to repeat evaluation with IR.    No general surgical plans at this facility.  Consideration could be given to transfer to his surgeon at [facility] given very recent surgery there.

## 2024-01-03 NOTE — PROGRESS NOTES
HOSPITALIST  ADMIT ACCEPT NOTE    1/3/2024 5:35 AM    Patient Information: JULIETA YORK   Date of Admit:  1/2/2024  Primary Care Physician:  No primary care provider on file.    Chief complaint:    Chief Complaint   Patient presents with    Abdominal Pain     Pt arrives via EMS with abd pain in RLQ. EMS states pt became hypotensive and has a hx of recent hernia repair          History of Present Illness:  JULIETA YORK is a 53 y.o. male found to have a retroperitoneal hemorrhage.  Patient taken to the interventional radiology department and procedure performed.  Presume some type of embolization.  Sheaths have been left in and patient will therefore go directly from IR to the ICU.      Provider to see patient: self      Rodrigo Vaughan MD

## 2024-01-03 NOTE — PROGRESS NOTES
This RN spoke with IR. Plan to keep arterial sheath in place and will re-evaluate tomorrow.     Mishel Chapa RN

## 2024-01-03 NOTE — ED PROVIDER NOTES
[facility] ED      CHIEF COMPLAINT  Abdominal Pain (Pt arrives via EMS with abd pain in RLQ. EMS states pt became hypotensive and has a hx of recent hernia repair /)       HISTORY OF PRESENT ILLNESS  Deondre Brothers is a 53 y.o. male  who presents to the ED complaining of abdominal pain.  Per EMS, patient is status post hernia repair 4 days ago in [place].  He is currently at [facility] due to crack cocaine dependence.  Patient said postsurgery he was feeling well not have any pain until about 1 to 2 hours prior to arrival he*developing pain in the right lower quadrant.  Hypotensive, per EMS.  Unable to obtain IV access in route.  Endorses nausea, no significant vomiting.  No fevers or chills.  No significant chest pain or shortness of breath.    No other complaints, modifying factors or associated symptoms.     I have reviewed the following from the nursing documentation.    No past medical history on file.  No past surgical history on file.  No family history on file.  Social History     Socioeconomic History    Marital status:      Spouse name: Not on file    Number of children: Not on file    Years of education: Not on file    Highest education level: Not on file   Occupational History    Not on file   Tobacco Use    Smoking status: Not on file    Smokeless tobacco: Not on file   Substance and Sexual Activity    Alcohol use: Not on file    Drug use: Not on file    Sexual activity: Not on file   Other Topics Concern    Not on file   Social History Narrative    Not on file     Social Determinants of Health     Financial Resource Strain: Not on file   Food Insecurity: Not on file   Transportation Needs: Not on file   Physical Activity: Not on file   Stress: Not on file   Social Connections: Not on file   Intimate Partner Violence: Not on file   Housing Stability: Not on file     Current Facility-Administered Medications   Medication Dose Route Frequency Provider Last

## 2024-01-03 NOTE — PROGRESS NOTES
Patient increasingly agitated. States he has to use the restroom and requesting to get out of bed. Patient educated on the importance of remaining flat, and unable to get out of bed at this time. Patient was offered a bedpan, where he refused and is now requesting to speak with a doctor.     Mishel Chapa RN

## 2024-01-03 NOTE — PROGRESS NOTES
Procedure complete at this time, no active bleed. Sheath left in place to KVO NS @ 10 ml for possible future need. Patient transported to ICU bed 4 with RN present, report given to LUIS Grey. Care transferred.  /Dee Germain RN

## 2024-01-03 NOTE — H&P
Admission H & P    Deondre Brothers;  MRN: [id number] ;   Admit Date: 1/2/2024  9:18 PM   Current Date and Time: 1/3/2024 7:21 AM    PCP  --  No primary care provider on file.         Chief Complaint   Patient presents with    Abdominal Pain     Pt arrives via EMS with abd pain in RLQ. EMS states pt became hypotensive and has a hx of recent hernia repair            HPI:  Patient is a 53 y.o.  male  With no past medical hx  presents with increasing right sided abd pain since last night     He apparently had robotic inguinal hernia repair in [place] 4 days ago and started with increasing right flank and side pain since yesterday with progressive worsening  No trauma or falls per pt. Has been doing well and had BM post surgery   Workup showed right sided extraperitoneal hematoma with active bleeding on Ct scan. He was admitted to ICU for pain control and close monitoring of h.h    IR was consulted and angiogram was unable to identify any active arterial bleed.   Pt continues to reports severe abd discomfort and remains restless       No Known Allergies    No past medical history on file.   No past surgical history on file.   No medications prior to admission.  No Known Allergies   Social History     Tobacco Use    Smoking status: Not on file    Smokeless tobacco: Not on file   Substance Use Topics    Alcohol use: Not on file      No family history on file.       Review of systems  ,  Constitutional: Negative for fever or chills  HENT: Negative for sore throat   Eyes: Negative for redness   Respiratory: Negative  for dyspnea, cough   Cardiovascular: Negative for chest pain   Gastrointestinal:as above   Genitourinary: Negative for hematuria   Musculoskeletal: Negative for arthralgias   Skin: Negative for rash   Neurological: Negative for syncope   Hematological: Negative for adenopathy   Psychiatric/Behavorial: Negative for anxiety      Objective:     VS: Temp: 97.5 °F (36.4 °C)  Pulse: 74  BP: (!) 87/52  SpO2: 99  %  O2 Flow Rate (L/min): 2 L/min        Physical Exam:      General: middle aged male fully awake, alert and oriented , in moderate painful distress   Awake, alert and oriented.   Mucous Membranes:  Pink , anicteric  Neck: No JVD, no carotid bruit, no thyromegaly  Chest:  Clear to auscultation bilaterally, no added sounds  Cardiovascular:  RRR S1S2 heard, no murmurs or gallops  Abdomen: hard ++distended, diffuse tenderness in entire lower abd  Healing recent robotic surgical scars   , no organomegaly, BS present  Left femoral artery access catheter   Extremities: No edema or cyanosis. Distal pulses well felt  Neurological : grossly normal non focal and in pain       LABS:  CBC:  Lab Results   Component Value Date/Time    WBC 9.2 01/02/2024 09:29 PM    HGB 10.7 01/03/2024 06:30 AM    HCT 30.7 01/03/2024 06:30 AM    MCV 99.5 01/02/2024 09:29 PM     01/02/2024 09:29 PM    NEUTOPHILPCT 66.9 01/02/2024 09:29 PM    LYMPHOPCT 23.0 01/02/2024 09:29 PM    MONOPCT 4.6 01/02/2024 09:29 PM    BASOPCT 0.7 01/02/2024 09:29 PM    NEUTROABS 6.2 01/02/2024 09:29 PM    LYMPHSABS 2.1 01/02/2024 09:29 PM    MONOSABS 0.4 01/02/2024 09:29 PM    EOSABS 0.4 01/02/2024 09:29 PM    BASOSABS 0.1 01/02/2024 09:29 PM     BMP:   Lab Results   Component Value Date/Time     01/02/2024 09:29 PM    K 4.7 01/02/2024 09:29 PM    CO2 29 01/02/2024 09:29 PM    BUN 30 01/02/2024 09:29 PM    CREATININE 1.1 01/02/2024 09:29 PM    CALCIUM 8.6 01/02/2024 09:29 PM     Coagulation:   Lab Results   Component Value Date/Time    INR 0.98 01/02/2024 09:29 PM     Cardiac markers: No results found for: \"CKMB\", \"CKTOTAL\", \"TROPONINI\", \"MYOGLOBIN\"  ABGs: No results found for: \"POCPH\", \"POCPCO2\", \"POCPO2\", \"POCHCO3\", \"POCTCO2\", \"POCFIO2\"    Lab Results   Component Value Date    ALT 15 01/02/2024    AST 14 (L) 01/02/2024    ALKPHOS 48 01/02/2024    BILITOT <0.2 01/02/2024       Lab Results   Component Value Date    INR 0.98 01/02/2024    PROTIME 13.0

## 2024-01-03 NOTE — CONSULTS
P [facility]                                 Pulmonary/Critical care  Consult /Progress Note :                                                                  CC : Abdominal pain  Patient is being seen at the request of Dr. CHEN for a consultation for hemorrhagic shock    HISTORY OF PRESENT ILLNESS:   Patient 53 s/p hernia repair last Friday 12/29 in [facility], presented to the hospital with worsening abdominal pain, he had a CAT scan of the chest that shows retroperitoneal hemorrhage with active extravasation    He was hemodynamically unstable however there was dramatic drop in his hemoglobin and later on his blood pressure dropped slightly    He was transfused blood and he was consulted with interventional radiology for embolization        PAST MEDICAL HISTORY:  No past medical history on file.  PAST SURGICAL HISTORY:  No past surgical history on file.    FAMILY HISTORY:  family history is not on file.    SOCIAL HISTORY:   has no history on file for tobacco use.    Scheduled Meds:   sodium chloride flush  5-40 mL IntraVENous 2 times per day    mupirocin   Each Nostril BID     Continuous Infusions:   ketamine (KETALAR) 500 mg in sodium chloride 0.9 % 100 mL infusion Stopped (01/03/24 0400)    sodium chloride      sodium chloride flush      sodium chloride       PRN Meds:  sodium chloride flush, sodium chloride, potassium chloride **OR** potassium alternative oral replacement **OR** potassium chloride, magnesium sulfate, ondansetron **OR** ondansetron, polyethylene glycol, acetaminophen **OR** acetaminophen, HYDROmorphone, sodium chloride    ALLERGIES:  Patient has No Known Allergies.    REVIEW OF SYSTEMS:  Constitutional: Negative for fever  HENT: Negative for sore throat  Eyes: Negative for redness   Respiratory: Negative for dyspnea, cough  Cardiovascular: Negative for chest pain  Gastrointestinal: Negative for vomiting,  hours.    Microbiology:  Ordered    Imaging:  Chest imaging was reviewed by me and showed retroperitoneal bleed    ASSESSMENT:  Retroperitoneal hemorrhage  S/p hernia repair,abdomen  Smoking  Possible COPD       PLAN:  Keep hemoglobin more than 10  Make sure patient has good IV access to peripherals, large-bore  H&H every 6  Hold all antiplatelet/anticoagulant, correct  Urine drug screen negative   Nicotine patches  SCD  Bactroban   Pain control as per IM

## 2024-01-03 NOTE — PROGRESS NOTES
Patient much more calm and cooperative after pain medications; however, patient was placed on 2L O2 for desatting to 85%. Patient in and out of sleep, will wean O2 off if tolerates. MD notified.     Mishel Chapa RN

## 2024-01-03 NOTE — ACP (ADVANCE CARE PLANNING)
Advance Care Planning     General Advance Care Planning (ACP) Conversation    Date of Conversation: 1/2/2024  Conducted with: Patient with Decision Making Capacity    Healthcare Decision Maker:  No healthcare decision makers have been documented.  Click here to complete HealthCare Decision Makers including selection of the Healthcare Decision Maker Relationship (ie \"Primary\")   Today we - the patient has one estranged daughter. The patient has not seen his daughter in one year. One year ago the patient saw his daughter on Facebook.     Content/Action Overview:    Reviewed DNR/DNI and patient elects Full Code (Attempt Resuscitation)        Length of Voluntary ACP Conversation in minutes:  <16 minutes (Non-Billable)    Teresa Boeck, RN

## 2024-01-03 NOTE — BRIEF OP NOTE
Brief Postoperative Note    Deondre Brothers  YOB: 1970  [id number]    Pre-operative Diagnosis: Pelvic/abdominal bleed    Post-operative Diagnosis: Same    Procedure: SMA angiogram, right external iliac angiogram, right common iliac angiogram, right inferior epigastric angiogram    Anesthesia: moderate sedation    Surgeons: Christopher Holliday MD    Estimated Blood Loss: Less than 5 mL    Complications: None    Specimens: None    Findings: Unable to locate the source of bleeding. Sheath left in the left common femoral artery.     Electronically signed by Christopher Holliday MD on 1/3/2024 at 5:43 AM

## 2024-01-04 ENCOUNTER — APPOINTMENT (OUTPATIENT)
Dept: CT IMAGING | Age: 54
DRG: 254 | End: 2024-01-04
Payer: COMMERCIAL

## 2024-01-04 VITALS
WEIGHT: 170 LBS | RESPIRATION RATE: 16 BRPM | OXYGEN SATURATION: 94 % | HEART RATE: 100 BPM | TEMPERATURE: 99.1 F | SYSTOLIC BLOOD PRESSURE: 134 MMHG | BODY MASS INDEX: 25.18 KG/M2 | DIASTOLIC BLOOD PRESSURE: 83 MMHG | HEIGHT: 69 IN

## 2024-01-04 LAB
ANION GAP SERPL CALCULATED.3IONS-SCNC: 3 MMOL/L (ref 3–16)
BASOPHILS # BLD: 0.1 K/UL (ref 0–0.2)
BASOPHILS NFR BLD: 1.1 %
BLOOD BANK DISPENSE STATUS: NORMAL
BLOOD BANK PRODUCT CODE: NORMAL
BPU ID: NORMAL
BUN SERPL-MCNC: 16 MG/DL (ref 7–20)
CALCIUM SERPL-MCNC: 8.1 MG/DL (ref 8.3–10.6)
CHLORIDE SERPL-SCNC: 99 MMOL/L (ref 99–110)
CO2 SERPL-SCNC: 30 MMOL/L (ref 21–32)
CREAT SERPL-MCNC: 0.6 MG/DL (ref 0.9–1.3)
DEPRECATED RDW RBC AUTO: 14.4 % (ref 12.4–15.4)
DESCRIPTION BLOOD BANK: NORMAL
EOSINOPHIL # BLD: 0.1 K/UL (ref 0–0.6)
EOSINOPHIL NFR BLD: 1.8 %
GFR SERPLBLD CREATININE-BSD FMLA CKD-EPI: >60 ML/MIN/{1.73_M2}
GLUCOSE SERPL-MCNC: 108 MG/DL (ref 70–99)
HCT VFR BLD AUTO: 22.3 % (ref 40.5–52.5)
HCT VFR BLD AUTO: 23.2 % (ref 40.5–52.5)
HCT VFR BLD AUTO: 24.2 % (ref 40.5–52.5)
HCT VFR BLD AUTO: 26.3 % (ref 40.5–52.5)
HGB BLD-MCNC: 7.7 G/DL (ref 13.5–17.5)
HGB BLD-MCNC: 8.1 G/DL (ref 13.5–17.5)
HGB BLD-MCNC: 8.3 G/DL (ref 13.5–17.5)
HGB BLD-MCNC: 8.9 G/DL (ref 13.5–17.5)
LYMPHOCYTES # BLD: 0.8 K/UL (ref 1–5.1)
LYMPHOCYTES NFR BLD: 10.7 %
MCH RBC QN AUTO: 33.5 PG (ref 26–34)
MCHC RBC AUTO-ENTMCNC: 34.4 G/DL (ref 31–36)
MCV RBC AUTO: 97.5 FL (ref 80–100)
MONOCYTES # BLD: 0.6 K/UL (ref 0–1.3)
MONOCYTES NFR BLD: 8.1 %
NEUTROPHILS # BLD: 5.6 K/UL (ref 1.7–7.7)
NEUTROPHILS NFR BLD: 78.3 %
PLATELET # BLD AUTO: 207 K/UL (ref 135–450)
PMV BLD AUTO: 6.8 FL (ref 5–10.5)
POTASSIUM SERPL-SCNC: 4.1 MMOL/L (ref 3.5–5.1)
RBC # BLD AUTO: 2.29 M/UL (ref 4.2–5.9)
SODIUM SERPL-SCNC: 132 MMOL/L (ref 136–145)
WBC # BLD AUTO: 7.1 K/UL (ref 4–11)

## 2024-01-04 PROCEDURE — 85014 HEMATOCRIT: CPT

## 2024-01-04 PROCEDURE — 74177 CT ABD & PELVIS W/CONTRAST: CPT

## 2024-01-04 PROCEDURE — 2580000003 HC RX 258: Performed by: INTERNAL MEDICINE

## 2024-01-04 PROCEDURE — 37799 UNLISTED PX VASCULAR SURGERY: CPT

## 2024-01-04 PROCEDURE — 36430 TRANSFUSION BLD/BLD COMPNT: CPT

## 2024-01-04 PROCEDURE — 80048 BASIC METABOLIC PNL TOTAL CA: CPT

## 2024-01-04 PROCEDURE — 6360000004 HC RX CONTRAST MEDICATION: Performed by: NURSE PRACTITIONER

## 2024-01-04 PROCEDURE — 99233 SBSQ HOSP IP/OBS HIGH 50: CPT | Performed by: INTERNAL MEDICINE

## 2024-01-04 PROCEDURE — 85018 HEMOGLOBIN: CPT

## 2024-01-04 PROCEDURE — 85025 COMPLETE CBC W/AUTO DIFF WBC: CPT

## 2024-01-04 PROCEDURE — 36415 COLL VENOUS BLD VENIPUNCTURE: CPT

## 2024-01-04 PROCEDURE — 6370000000 HC RX 637 (ALT 250 FOR IP): Performed by: INTERNAL MEDICINE

## 2024-01-04 PROCEDURE — 99291 CRITICAL CARE FIRST HOUR: CPT | Performed by: INTERNAL MEDICINE

## 2024-01-04 PROCEDURE — 6360000002 HC RX W HCPCS: Performed by: INTERNAL MEDICINE

## 2024-01-04 PROCEDURE — 2500000003 HC RX 250 WO HCPCS: Performed by: INTERNAL MEDICINE

## 2024-01-04 RX ORDER — SODIUM CHLORIDE 9 MG/ML
INJECTION, SOLUTION INTRAVENOUS PRN
Status: DISCONTINUED | OUTPATIENT
Start: 2024-01-04 | End: 2024-01-04 | Stop reason: HOSPADM

## 2024-01-04 RX ADMIN — OXYCODONE AND ACETAMINOPHEN 1 TABLET: 5; 325 TABLET ORAL at 04:55

## 2024-01-04 RX ADMIN — IOPAMIDOL 75 ML: 755 INJECTION, SOLUTION INTRAVENOUS at 03:18

## 2024-01-04 RX ADMIN — HYDROMORPHONE HYDROCHLORIDE 1 MG: 1 INJECTION, SOLUTION INTRAMUSCULAR; INTRAVENOUS; SUBCUTANEOUS at 07:21

## 2024-01-04 RX ADMIN — HYDROMORPHONE HYDROCHLORIDE 1 MG: 1 INJECTION, SOLUTION INTRAMUSCULAR; INTRAVENOUS; SUBCUTANEOUS at 12:42

## 2024-01-04 RX ADMIN — HYDROMORPHONE HYDROCHLORIDE 1 MG: 1 INJECTION, SOLUTION INTRAMUSCULAR; INTRAVENOUS; SUBCUTANEOUS at 17:32

## 2024-01-04 RX ADMIN — HYDROMORPHONE HYDROCHLORIDE 1 MG: 1 INJECTION, SOLUTION INTRAMUSCULAR; INTRAVENOUS; SUBCUTANEOUS at 00:25

## 2024-01-04 RX ADMIN — ONDANSETRON 4 MG: 2 INJECTION INTRAMUSCULAR; INTRAVENOUS at 12:16

## 2024-01-04 RX ADMIN — HYDROMORPHONE HYDROCHLORIDE 1 MG: 1 INJECTION, SOLUTION INTRAMUSCULAR; INTRAVENOUS; SUBCUTANEOUS at 19:41

## 2024-01-04 RX ADMIN — OXYCODONE AND ACETAMINOPHEN 1 TABLET: 5; 325 TABLET ORAL at 12:16

## 2024-01-04 RX ADMIN — HYDROMORPHONE HYDROCHLORIDE 1 MG: 1 INJECTION, SOLUTION INTRAMUSCULAR; INTRAVENOUS; SUBCUTANEOUS at 15:05

## 2024-01-04 RX ADMIN — HYDROMORPHONE HYDROCHLORIDE 1 MG: 1 INJECTION, SOLUTION INTRAMUSCULAR; INTRAVENOUS; SUBCUTANEOUS at 03:43

## 2024-01-04 RX ADMIN — DOCUSATE SODIUM 100 MG: 100 CAPSULE, LIQUID FILLED ORAL at 08:08

## 2024-01-04 RX ADMIN — ONDANSETRON 4 MG: 2 INJECTION INTRAMUSCULAR; INTRAVENOUS at 19:40

## 2024-01-04 RX ADMIN — MUPIROCIN: 20 OINTMENT TOPICAL at 08:07

## 2024-01-04 RX ADMIN — ONDANSETRON 4 MG: 2 INJECTION INTRAMUSCULAR; INTRAVENOUS at 00:25

## 2024-01-04 RX ADMIN — SODIUM CHLORIDE, PRESERVATIVE FREE 10 ML: 5 INJECTION INTRAVENOUS at 08:08

## 2024-01-04 RX ADMIN — ONDANSETRON 4 MG: 2 INJECTION INTRAMUSCULAR; INTRAVENOUS at 07:21

## 2024-01-04 RX ADMIN — HYDROMORPHONE HYDROCHLORIDE 1 MG: 1 INJECTION, SOLUTION INTRAMUSCULAR; INTRAVENOUS; SUBCUTANEOUS at 09:41

## 2024-01-04 RX ADMIN — HYDROMORPHONE HYDROCHLORIDE 1 MG: 1 INJECTION, SOLUTION INTRAMUSCULAR; INTRAVENOUS; SUBCUTANEOUS at 13:00

## 2024-01-04 ASSESSMENT — PAIN DESCRIPTION - PAIN TYPE
TYPE: ACUTE PAIN
TYPE: ACUTE PAIN

## 2024-01-04 ASSESSMENT — PAIN SCALES - GENERAL
PAINLEVEL_OUTOF10: 10
PAINLEVEL_OUTOF10: 8
PAINLEVEL_OUTOF10: 10
PAINLEVEL_OUTOF10: 9
PAINLEVEL_OUTOF10: 8
PAINLEVEL_OUTOF10: 10
PAINLEVEL_OUTOF10: 7

## 2024-01-04 ASSESSMENT — PAIN DESCRIPTION - LOCATION
LOCATION: ABDOMEN

## 2024-01-04 ASSESSMENT — PAIN DESCRIPTION - ORIENTATION: ORIENTATION: LEFT

## 2024-01-04 ASSESSMENT — PAIN DESCRIPTION - DESCRIPTORS
DESCRIPTORS: BURNING;PRESSURE
DESCRIPTORS: CRUSHING
DESCRIPTORS: CRUSHING;BURNING

## 2024-01-04 NOTE — PROGRESS NOTES
Dr CHEN at the bedside; approved additional dose of PRN dilaudid. Transfer center in contact, awaiting call back from Surgeon who did the patients procedure.

## 2024-01-04 NOTE — PROGRESS NOTES
HGB continues to trend down. 10.3 - 9.0 - 8.1 this shift. VS remain stable, but pt continues to endorse crushing burning pain to abdomen, and abdomen remains distended with unchanged bruising to umbillicus and right flank area. Hospitalist notified, order obtained for stat CTAP.

## 2024-01-04 NOTE — PROGRESS NOTES
AM assessment complete, see flowsheet. Medications given per MAR. Pt A&O x4, continues to complain of abdominal/flank pain of 10/10. PRN pain medications given as available. VSS, on 2L NC per patient request. IV lines checked and tightened. 1 unit PRBC infusing. Repeat H&H at 0900.    Surgery paged over night for CT results and continued drop in H&H. Awaiting Morning rounds.

## 2024-01-04 NOTE — CONSULTS
Clinical Ethics Consultation Note    History and Context:  Patient requested we contact his estranged daughter. Patient has capacity, and she is not needed for surrogate decision making at this time.  Valid Advanced Medical Directive: No    Process:  Worked with legal to identify NOK and contact information for the person named as his daughter. The named woman was not identified in our NOK search, so we have no contact information for her. The information the patient gave us was not sufficient to locate the woman independent of a legal relationship to him. Other family members did appear in his search.    Closing:  We made a substantial effort to locate the woman the patient named as his daughter but were unsuccessful in identifying contact information for her. At this time, a surrogate decision maker is not necessary. If one should become necessary, we can contact the relatives who did appear in the NOK search.     Kalen Bustillo  Director of [place]    Primary Ethical Theme: Primary Ethical Themes: Clarify legal surrogate decision maker  Secondary Ethical Theme:

## 2024-01-04 NOTE — PROGRESS NOTES
P Pulmonary, Critical Care [facility] Sleep Special[facility]                                 Pulmonary/Critical care  Consult /Progress Note :                                                                  CC : Abdominal pain    HISTORY OF PRESENT ILLNESS:   Hb dropped again   Hematoma increased   Vitals stable   Vitals remain stable   On 2 L but stable  without it 96 ,he feels it helps     PHYSICAL EXAM:  Blood pressure 126/84, pulse 64, temperature 97.8 °F (36.6 °C), temperature source Temporal, resp. rate 14, height 1.753 m (5' 9\"), weight 77.1 kg (170 lb), SpO2 96 %.' on RA  Gen: No distress.   Eyes: PERRL. No sclera icterus. No conjunctival injection.   ENT: No discharge. Pharynx clear.   Neck: Trachea midline. No obvious mass.    Resp: No accessory muscle use. No crackles. No wheezes. No rhonchi. No dullness on percussion.  CV: Regular rate. Regular rhythm. No murmur or rub. No edema. Peripheral pulses are 2+.  Capillary refill is less than 3 seconds.  GI: Non-tender. Non-distended. No hernia.   Skin: Warm and dry. No nodule on exposed extremities.   Lymph: No cervical LAD. No supraclavicular LAD.   M/S: No cyanosis. No joint deformity. No clubbing.   Neuro: Awake. Alert. Moves all four extremities.   Psych: Oriented x 3. No anxiety.     LABS:  CBC:   Recent Labs     01/02/24 2129 01/03/24 0100 01/03/24 2056 01/04/24  0220 01/04/24  0455   WBC 9.2  --   --   --  7.1   HGB 13.1*   < > 9.0* 8.1* 7.7*   HCT 38.5*   < > 25.9* 23.2* 22.3*   MCV 99.5  --   --   --  97.5     --   --   --  207    < > = values in this interval not displayed.       BMP:   Recent Labs     01/02/24 2129 01/04/24  0455   * 132*   K 4.7 4.1   CL 95* 99   CO2 29 30   BUN 30* 16   CREATININE 1.1 0.6*       LIVER PROFILE:   Recent Labs     01/02/24 2129   AST 14*   ALT 15   LIPASE 15.0   BILITOT <0.2   ALKPHOS 48       PT/INR:   Recent Labs     01/02/24 2129   PROTIME 13.0   INR 0.98        APTT: No results for input(s): \"APTT\" in the last 72 hours.  UA:  Recent Labs     01/02/24  2301   COLORU Yellow   PHUR 7.0  7.0   CLARITYU Clear   SPECGRAV 1.015   LEUKOCYTESUR Negative   UROBILINOGEN 0.2   BILIRUBINUR Negative   BLOODU Negative   GLUCOSEU Negative       Recent Labs     01/03/24  1335   PHART 7.442   AVU2YGK 35.5   PO2ART 94.0       Microbiology:  Ordered    Imaging:  Chest imaging was reviewed by me and showed retroperitoneal bleed    ASSESSMENT:  Retroperitoneal hemorrhage  S/p hernia repair,abdomen  Smoking  Possible COPD       PLAN:  Keep hemoglobin more than 10,got unit  Still dropping his hb ,surgery recommended   Make sure patient has good IV access to ,ask staff to have to klarge peripheral   Discussed with hospitlist start transfer process  H&H every 6  Hold all antiplatelet/anticoagulant, correct  Urine drug screen negative   Nicotine patches  SCD  Bactroban   Pain control as per IM   Surgery ok with clear liquids  Explain plan to patient   No extravasation in current CT ,IR attempted embolization 1/3   Discussed with Dr CHEN,he will start the transfer process immediately   Thank you very much for allowing me to see this patient in consultation and follow up.    Care reviewed with nursing staff, medical and surgical specialty care, primary care and the patient's family as available.     Critical care time spent reviewing labs/films, examining patient, collaborating with other physicians but excluding procedures for life threatening organ failure is:    Critical Care Time: > 35 minutes excluding procedures     Gisela Camacho M.D.  1/4/2024  10:33 AM    Gisela Camacho MD,[facility]  Pulmonary&Critical Care Medicine   [facility] Pulmonary,Sleep ,and Critical care    [facility]

## 2024-01-04 NOTE — PROGRESS NOTES
Bedside report and transfer of care given to LUIS Gutierrez. Pt currently resting in bed with the call light within reach. Pt denies any other care needs at this time. Pt stable at this time.      Mishel Chapa RN

## 2024-01-04 NOTE — PROGRESS NOTES
Pt increasing pain and yelling out and cussing at staff. PRN oral meds given. Dr Rodriguez paged to come to bedside and assess. Secure message, he is on his way.

## 2024-01-04 NOTE — CARE COORDINATION
KAIDEN met with Kalen Bustillo/Reji to discuss the patient's request to locate his daughter.     Kalen advised she/team has been unable to successfully locate the patient's daughter and Kalen will speak to the patient about this.     Kalen spoke with the patient and advised the patient was not happy with the hospitals attempt to locate his daughter. The patient requested Kalen contact a  on his behalf.    Kalen advised the patient she is unable to contact a  on his behalf. Kalen informed the patient that he can call the  from his room phone.    KAIDEN received a call from the [place] [employer] office requesting to know why the patient is here and basically the patient's circumstances. CM provided the reason the patient is here and the basic circumstances as related to CM by he patient.     The [place] advised that if the patient had been in an automobile accident he could assist in locating the patient's family or if the patient had been arrested and the [place] needed someone to pick the patient up he could try to locate the family, however under the current circumstances they [place] informed CM that he would have to decline.     KAIDEN notified Kalen Bustillo/Reji.

## 2024-01-04 NOTE — PROGRESS NOTES
IM Progress Note    Admit Date:  1/2/2024  1    Interval history:  retroperitoneal hematoma    Subjective:  Mr. Brothers seen up in bed writhing in pain , reports increasing right lower quadrant pain   No fevers  No hypotension noted   Given 2 units so far since yesterday, repeat CT abd with IV contrast with increased size but no extravasation      Objective:   /84   Pulse 76   Temp 97.8 °F (36.6 °C) (Oral)   Resp 17   Ht 1.753 m (5' 9\")   Wt 77.1 kg (170 lb)   SpO2 98%   BMI 25.10 kg/m²     Intake/Output Summary (Last 24 hours) at 1/4/2024 0718  Last data filed at 1/4/2024 0343  Gross per 24 hour   Intake 480 ml   Output 1550 ml   Net -1070 ml       Physical Exam:       General: middle aged male fully awake, alert and oriented , in moderate painful distress   Mucous Membranes:  Pink , anicteric  Neck: No JVD, no carotid bruit, no thyromegaly  Chest:  Clear to auscultation bilaterally, no added sounds  Cardiovascular:  RRR S1S2 heard, no murmurs or gallops  Abdomen: hard ++distended, diffuse tenderness in entire lower abd  Particularly on right lower quadrant  Healing recent robotic surgical scars   , no organomegaly, BS present  Left femoral artery access catheter   Extremities: No edema or cyanosis. Distal pulses well felt  Neurological : grossly normal non focal and in painful distress    Medications:   Scheduled Medications:    sodium chloride flush  5-40 mL IntraVENous 2 times per day    mupirocin   Each Nostril BID    nicotine  1 patch TransDERmal Daily    polyethylene glycol  17 g Oral BID    docusate sodium  100 mg Oral Daily     I   sodium chloride      sodium chloride      sodium chloride flush      sodium chloride       sodium chloride, sodium chloride flush, sodium chloride, potassium chloride **OR** potassium alternative oral replacement **OR** potassium chloride, magnesium sulfate, ondansetron **OR** ondansetron, polyethylene glycol, acetaminophen **OR** acetaminophen, HYDROmorphone,  extravasation along the right anterolateral   margin of this collection, which could be from a small inferior epigastric   branch.      Findings were discussed with JNAELL MENA at 11:14 pm on 1/2/2024.         XR CHEST PORTABLE   Final Result   Streaky opacities in the left lung base may represent atelectasis versus   developing infiltrate.                ASSESMENT & PLAN:      Large right Retroperitoneal hematoma, 11.4x 9x 12 cm , located in right pelvis   S/p recent robotic inguinal hernia repair POD 5 in [place]  - IR guided angiogram did not reveal any active arterial bleed  Given 2 units so far since yesterday for drop in h.h but vitals stable    repeat CT abd with IV contrast with increased size but no extravasation  - monitor h.h and transfuse to keep hb above 7  - pain control with oral percocet and IV dilaudid  - monitor vitals  - surgery and IR on board=- plan for transfer to [place] where he had abd surgery last week        Acute blood loss anemia-   S.p 2 unit PRBC  Hb remains stable now     Constipation   - resolved      Diet: clears  GI/DVT PX: sdc  CODE STATUS: full code      Contact transfer center to arrange for transfer to [place]    Raúl Rodriguez MD, 1/4/2024 7:27 AM

## 2024-01-04 NOTE — PROGRESS NOTES
Spoke wit Surgery PA, no plans to operate from their standpoint. Advising pt to be transferred back to [facility] where original surgery took place. Hospitalist paged, awaiting orders/call back.

## 2024-01-04 NOTE — PROGRESS NOTES
Pt A&O x4, anxious and agitated. VSS. SR per monitor without ectopy. Requesting 2 lpm NC \"to enrich my blood\", O2 sats 98% on RA, NC applied per pt request. L fem arterial sheath intact and LLE unremarkable, pt compliant with mobility restrictions. Bed kept flat and in reverse trendelenberg. Voiding adequate UOP per urinal. Voided loose stool per bedpan. Abdomen rigid, bruising noted to umbillicus and R flank with pt reports of crushing, burning, sharp pain. PRN dilaudid/percocet given as ordered. H&H drawn as ordered and results noted. Will cont to monitor.

## 2024-01-04 NOTE — PROGRESS NOTES
Bed received for [facility]. 4 Blue, Rm 4N10. Floor # for report, [phone]. Access center to set up transport.

## 2024-01-04 NOTE — PLAN OF CARE
Problem: Discharge Planning  Goal: Discharge to home or other facility with appropriate resources  Outcome: Progressing  Flowsheets (Taken 1/3/2024 2000)  Discharge to home or other facility with appropriate resources:   Identify barriers to discharge with patient and caregiver   Refer to discharge planning if patient needs post-hospital services based on physician order or complex needs related to functional status, cognitive ability or social support system     Problem: Pain  Goal: Verbalizes/displays adequate comfort level or baseline comfort level  Outcome: Progressing     Problem: Safety - Adult  Goal: Free from fall injury  Outcome: Progressing     Problem: Skin/Tissue Integrity  Goal: Absence of new skin breakdown  Description: 1.  Monitor for areas of redness and/or skin breakdown  2.  Assess vascular access sites hourly  3.  Every 4-6 hours minimum:  Change oxygen saturation probe site  4.  Every 4-6 hours:  If on nasal continuous positive airway pressure, respiratory therapy assess nares and determine need for appliance change or resting period.  Outcome: Progressing

## 2024-01-04 NOTE — PROGRESS NOTES
Sheath pulled intact and manual pressure held for 20min. No hematoma noted at arterial access site or bleeding from punture site. Advised pt and Truman RN that would need to keep leg straight and lie flat to minimal elevation for 4 hours. Both voiced understanding. Advised to monitor for hematoma formation. Pedal pulse strong palpable pre and post pull.

## 2024-01-04 NOTE — PROGRESS NOTES
Hemoglobin continues to drop.  PRBC x1 ordered.  General surgery paged.  Do they want to transfer?

## 2024-01-05 NOTE — PROGRESS NOTES
Report called to Bennie Painter ICU RN. All questions answered. Flight care in route. Pt updated and made aware.

## 2024-01-05 NOTE — PROGRESS NOTES
Report given to flight crew for pt transfer. Pt stable. Pt loaded and wheeled off of unit. Care transferred at this time.

## 2024-01-07 LAB
BACTERIA BLD CULT ORG #2: NORMAL
BACTERIA BLD CULT: NORMAL

## 2024-01-09 NOTE — PROGRESS NOTES
Physician Progress Note      PATIENT:               JULIETA YORK  CSN #:                  [id number]  :                       1970  ADMIT DATE:       2024 9:18 PM  DISCH DATE:        2024 8:21 PM  RESPONDING  PROVIDER #:        Raúl Rodriguez MD          QUERY TEXT:    Pt admitted with retroperitoneal hemorrhage, S/p recent robotic inguinal   hernia repair POD 4.  After study, please specify:    The medical record reflects the following:  Risk Factors: age, S/p recent robotic inguinal hernia repair POD 4  Clinical Indicators: CT abd-Large extraperitoneal hematoma in the right pelvis   measures at least 11.4 x  9.0 x 12 cm.  Treatment: IR consult, GS consult, monitor hgb, PRBC, possible transfer to his   surgeon at [facility]    Thank you,  Hailee Stevens RN,BSN,CCDS,CRCR  Options provided:  -- Retroperitoneal hemorrhage related to recent robotic inguinal hernia repair  -- Retroperitoneal hemorrhage unrelated to recent robotic inguinal hernia   repair  -- Other - I will add my own diagnosis  -- Disagree - Not applicable / Not valid  -- Disagree - Clinically unable to determine / Unknown  -- Refer to Clinical Documentation Reviewer    PROVIDER RESPONSE TEXT:    The patient with retroperitoneal hemorrhage related to recent robotic inguinal   hernia repair.    Query created by: Hailee Stevens on 2024 11:40 AM      Electronically signed by:  Raúl Rodriguez MD 2024 6:58 AM